# Patient Record
Sex: MALE | Race: WHITE
[De-identification: names, ages, dates, MRNs, and addresses within clinical notes are randomized per-mention and may not be internally consistent; named-entity substitution may affect disease eponyms.]

---

## 2018-05-13 ENCOUNTER — HOSPITAL ENCOUNTER (EMERGENCY)
Dept: HOSPITAL 92 - ERS | Age: 33
Discharge: HOME | End: 2018-05-13
Payer: COMMERCIAL

## 2018-05-13 DIAGNOSIS — S22.040A: ICD-10-CM
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
## 2018-05-13 DIAGNOSIS — S01.01XA: ICD-10-CM

## 2018-05-13 DIAGNOSIS — F17.210: ICD-10-CM

## 2018-05-13 DIAGNOSIS — S22.030A: Primary | ICD-10-CM

## 2018-05-13 PROCEDURE — 72125 CT NECK SPINE W/O DYE: CPT

## 2018-05-13 PROCEDURE — 71260 CT THORAX DX C+: CPT

## 2018-05-13 PROCEDURE — 70450 CT HEAD/BRAIN W/O DYE: CPT

## 2018-05-13 PROCEDURE — 96374 THER/PROPH/DIAG INJ IV PUSH: CPT

## 2018-05-13 PROCEDURE — G0390 TRAUMA RESPONS W/HOSP CRITI: HCPCS

## 2018-05-13 PROCEDURE — 12004 RPR S/N/AX/GEN/TRK7.6-12.5CM: CPT

## 2018-05-13 PROCEDURE — 74177 CT ABD & PELVIS W/CONTRAST: CPT
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
## 2018-05-22 ENCOUNTER — HOSPITAL ENCOUNTER (EMERGENCY)
Dept: HOSPITAL 92 - ERS | Age: 33
Discharge: HOME | End: 2018-05-22
Payer: SELF-PAY

## 2018-05-22 DIAGNOSIS — F17.210: ICD-10-CM

## 2018-05-22 DIAGNOSIS — Z79.899: ICD-10-CM

## 2018-05-22 DIAGNOSIS — S01.01XD: Primary | ICD-10-CM

## 2018-06-18 ENCOUNTER — HOSPITAL ENCOUNTER (OUTPATIENT)
Dept: HOSPITAL 92 - TBSIIMAG | Age: 33
Discharge: HOME | End: 2018-06-18
Attending: NEUROLOGICAL SURGERY
Payer: COMMERCIAL

## 2018-06-18 DIAGNOSIS — M54.6: Primary | ICD-10-CM

## 2018-06-18 PROCEDURE — 72070 X-RAY EXAM THORAC SPINE 2VWS: CPT
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
## 2018-07-17 ENCOUNTER — HOSPITAL ENCOUNTER (OUTPATIENT)
Dept: HOSPITAL 92 - TBSIIMAG | Age: 33
Discharge: HOME | End: 2018-07-17
Attending: NEUROLOGICAL SURGERY
Payer: COMMERCIAL

## 2018-07-17 DIAGNOSIS — M48.56XA: Primary | ICD-10-CM

## 2018-07-17 PROCEDURE — 72072 X-RAY EXAM THORAC SPINE 3VWS: CPT
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
